# Patient Record
Sex: MALE | URBAN - METROPOLITAN AREA
[De-identification: names, ages, dates, MRNs, and addresses within clinical notes are randomized per-mention and may not be internally consistent; named-entity substitution may affect disease eponyms.]

---

## 2021-06-29 ENCOUNTER — OFFICE VISIT (OUTPATIENT)
Dept: URBAN - METROPOLITAN AREA CLINIC 64 | Facility: CLINIC | Age: 71
End: 2021-06-29
Payer: COMMERCIAL

## 2021-06-29 PROCEDURE — 92134 CPTRZ OPH DX IMG PST SGM RTA: CPT | Performed by: OPTOMETRIST

## 2021-06-29 PROCEDURE — 92004 COMPRE OPH EXAM NEW PT 1/>: CPT | Performed by: OPTOMETRIST

## 2021-06-29 ASSESSMENT — INTRAOCULAR PRESSURE
OD: 11
OS: 13

## 2021-06-29 ASSESSMENT — KERATOMETRY
OD: 42.16
OS: 41.92

## 2021-06-29 ASSESSMENT — VISUAL ACUITY
OS: 20/40
OD: 20/30

## 2021-06-29 NOTE — IMPRESSION/PLAN
Impression: Diabetes mellitus Type 2 with moderate non-proliferative retinopathy with macular edema, bilateral: Z32.2053. Plan: Pt. ed. on findings. He reports vision changes started about a year ago. Recommend he see a retina specialist to prevent further vision loss. Watch blood sugar closely.

## 2021-08-17 ENCOUNTER — OFFICE VISIT (OUTPATIENT)
Dept: URBAN - METROPOLITAN AREA CLINIC 64 | Facility: CLINIC | Age: 71
End: 2021-08-17
Payer: COMMERCIAL

## 2021-08-17 PROCEDURE — 99204 OFFICE O/P NEW MOD 45 MIN: CPT | Performed by: OPHTHALMOLOGY

## 2021-08-17 PROCEDURE — 67028 INJECTION EYE DRUG: CPT | Performed by: OPHTHALMOLOGY

## 2021-08-17 PROCEDURE — 92134 CPTRZ OPH DX IMG PST SGM RTA: CPT | Performed by: OPHTHALMOLOGY

## 2021-08-17 ASSESSMENT — INTRAOCULAR PRESSURE
OD: 17
OS: 17

## 2021-08-17 NOTE — IMPRESSION/PLAN
Impression: Presence of intraocular lens: Z96.1. Plan: Clear PCIOL OU. No further tx needed. View good.    [facility] Dr. Joan Desouza

## 2021-08-17 NOTE — IMPRESSION/PLAN
Impression: DM2 mod/severe non-prolif retinopathy w  DME cysts OS > OD Plan: Pt. ed. on findings. Pt confesses poor care. UNK A1c. MUST GET A1c today send lab. IF <12.0 may qualify for PAVILION. OS too much edema must start tx w injx. OD <325um OK to PAVILION OD.   TODAY Avastin OS injx (proc note). RTC <1w w A1c results (IF <12.0) and plan PAVILION SCREEN for OD
   SOC injx OS today begin and continue. IF DECLINE STUDY - ND/inj/pos OCT Avast OU May be candidate for research study - no other eye dz, no glaucoma, no subfoveal Hmg or subfoveal atrophy / fibrosis. Long d/w pt r/b/a and options for standard Tx v. Research Study med options. May need Travel assist but pt is committed to care.

## 2021-09-23 ENCOUNTER — OFFICE VISIT (OUTPATIENT)
Dept: URBAN - METROPOLITAN AREA CLINIC 64 | Facility: CLINIC | Age: 71
End: 2021-09-23
Payer: COMMERCIAL

## 2021-09-23 DIAGNOSIS — Z96.1 PRESENCE OF INTRAOCULAR LENS: ICD-10-CM

## 2021-09-23 PROCEDURE — 92134 CPTRZ OPH DX IMG PST SGM RTA: CPT | Performed by: OPHTHALMOLOGY

## 2021-09-23 ASSESSMENT — INTRAOCULAR PRESSURE
OS: 11
OD: 11

## 2021-09-23 NOTE — IMPRESSION/PLAN
Impression: Presence of intraocular lens: Z96.1. Plan: Clear PCIOL OU. No further tx needed. View good.    [facility] Dr. Powell Persons

## 2021-09-23 NOTE — IMPRESSION/PLAN
Impression: DM2 mod/severe non-prolif retinopathy w  DME cysts OS > OD Plan: hx: [[Pt confesses poor care. UNK A1c. MUST GET A1c. Pt DECLINED [facility] study - BEGIN [facility] in Sep '21]] TODAY Avastin OU injx (proc note)   -- Future ND? RTC 4-5w overbook OK -- FA baseline and plan Avastin OS (then OD PRN) Future may benefit ILUVIEN or STEROID in the OS. LARGE MA and CYSTS.

## 2021-10-29 ENCOUNTER — OFFICE VISIT (OUTPATIENT)
Dept: URBAN - METROPOLITAN AREA CLINIC 10 | Facility: CLINIC | Age: 71
End: 2021-10-29
Payer: COMMERCIAL

## 2021-10-29 PROCEDURE — 92235 FLUORESCEIN ANGRPH MLTIFRAME: CPT | Performed by: OPHTHALMOLOGY

## 2021-10-29 PROCEDURE — 67028 INJECTION EYE DRUG: CPT | Performed by: OPHTHALMOLOGY

## 2021-10-29 PROCEDURE — 92134 CPTRZ OPH DX IMG PST SGM RTA: CPT | Performed by: OPHTHALMOLOGY

## 2021-10-29 PROCEDURE — 92250 FUNDUS PHOTOGRAPHY W/I&R: CPT | Performed by: OPHTHALMOLOGY

## 2021-10-29 ASSESSMENT — INTRAOCULAR PRESSURE
OS: 14
OD: 13

## 2021-10-29 NOTE — IMPRESSION/PLAN
Impression: DM2 mod/severe non-prolif retinopathy w  DME cysts OS > OD Plan: hx: [[Pt confesses poor care. UNK A1c. MUST GET A1c. Pt DECLINED [facility] study - BEGIN [facility] in Sep '21 -- Avastin inj OS / PRN OD . Di Jacquelyn . Future steroid? Extend?]] TODAY Avastin OU injx (proc note)   -- Future Exam ?   
    RTC 6w ND/inj/OCT plan Avastin OS (OD move to PRN) Future may benefit ILUVIEN or STEROID in the OS. LARGE MA and CYSTS.

## 2021-12-07 ENCOUNTER — OFFICE VISIT (OUTPATIENT)
Dept: URBAN - METROPOLITAN AREA CLINIC 64 | Facility: CLINIC | Age: 71
End: 2021-12-07
Payer: COMMERCIAL

## 2021-12-07 PROCEDURE — 67028 INJECTION EYE DRUG: CPT | Performed by: OPHTHALMOLOGY

## 2021-12-07 PROCEDURE — 92134 CPTRZ OPH DX IMG PST SGM RTA: CPT | Performed by: OPHTHALMOLOGY

## 2021-12-07 ASSESSMENT — INTRAOCULAR PRESSURE
OS: 16
OD: 16

## 2021-12-07 NOTE — IMPRESSION/PLAN
Impression: DM2 mod/severe non-prolif retinopathy w  DME cysts OS > OD Plan: hx: [[Pt confesses poor care. UNK A1c. MUST GET A1c. Pt DECLINED [facility] study - BEGIN [facility] in Sep '21 -- Avastin inj OS / PRN OD . Tigist Nunez . Future steroid? Extend?]] TODAY Avastin OU injx (proc note)   -- Future FA ? (FAIL Avastin) RTC 6w  Dil, OCT, eval - h/o inj OS EYLEA  (FAIL Avastin OS -- OD move to PRN) Future may benefit ILUVIEN or STEROID  OS. LARGE MA and CYSTS.

## 2021-12-07 NOTE — IMPRESSION/PLAN
Impression: Presence of intraocular lens: Z96.1. Plan: Clear PCIOL OU. No further tx needed. View good. Gen eye care Dr. Karely Gilman pt to get to [facility] and get A1c and f/u [facility] eye care PRN

## 2022-01-13 ENCOUNTER — OFFICE VISIT (OUTPATIENT)
Dept: URBAN - METROPOLITAN AREA CLINIC 64 | Facility: CLINIC | Age: 72
End: 2022-01-13
Payer: COMMERCIAL

## 2022-01-13 PROCEDURE — 99214 OFFICE O/P EST MOD 30 MIN: CPT | Performed by: OPHTHALMOLOGY

## 2022-01-13 PROCEDURE — 67028 INJECTION EYE DRUG: CPT | Performed by: OPHTHALMOLOGY

## 2022-01-13 PROCEDURE — 92134 CPTRZ OPH DX IMG PST SGM RTA: CPT | Performed by: OPHTHALMOLOGY

## 2022-01-13 ASSESSMENT — INTRAOCULAR PRESSURE
OS: 19
OD: 17

## 2022-01-13 NOTE — IMPRESSION/PLAN
Impression: DM2 mod/severe NPDR w DME OS >OD Plan: hx: [[Pt confesses poor care. UNK A1c. MUST GET A1c. Pt DECLINED [facility] study - BEGIN [facility] in Sep '21 -- Avastin inj OS / PRN OD . Brenita Danyel . Future steroid? Extend?]] TODAY EyLea OU injx (proc note)   -- Future ND?   (FAIL Avastin) RTC 6w Pos FA / plan inj OS EYLEA  (OD only PRN-- FAIL Avastin OS) Future may benefit ILUVIEN or STEROID  OS. LARGE MA and CYSTS.

## 2022-01-13 NOTE — IMPRESSION/PLAN
Impression: Presence of intraocular lens: Z96.1 Few floaters  Plan: Today repeated exam shows no signif. PCO / Centered and Clear PCIOL OU. No further tx needed. View good. Gen eye care Dr. Babs Martínez    URGE pt  f/u gen eye care - URGED keep [facility] and get A1c

## 2022-02-22 ENCOUNTER — OFFICE VISIT (OUTPATIENT)
Dept: URBAN - METROPOLITAN AREA CLINIC 10 | Facility: CLINIC | Age: 72
End: 2022-02-22
Payer: COMMERCIAL

## 2022-02-22 DIAGNOSIS — E11.3313 DIABETES MELLITUS TYPE 2 WITH MODERATE NON-PROLIFERATIVE RETINOPATHY WITH MACULAR EDEMA, BILATERAL: Primary | ICD-10-CM

## 2022-02-22 PROCEDURE — 92134 CPTRZ OPH DX IMG PST SGM RTA: CPT | Performed by: OPHTHALMOLOGY

## 2022-02-22 PROCEDURE — 67028 INJECTION EYE DRUG: CPT | Performed by: OPHTHALMOLOGY

## 2022-02-22 PROCEDURE — 92250 FUNDUS PHOTOGRAPHY W/I&R: CPT | Performed by: OPHTHALMOLOGY

## 2022-02-22 PROCEDURE — 99214 OFFICE O/P EST MOD 30 MIN: CPT | Performed by: OPHTHALMOLOGY

## 2022-02-22 PROCEDURE — 92235 FLUORESCEIN ANGRPH MLTIFRAME: CPT | Performed by: OPHTHALMOLOGY

## 2022-02-22 NOTE — IMPRESSION/PLAN
Impression: DM2 mod/severe NPDR w DME OS >OD Plan: hx: [[Pt confesses poor care. UNK A1c. MUST GET A1c. Pt DECLINED [facility] study - BEGIN [facility] in Sep '21 -- Avastin inj OS / PRN OD . Joanna Santoyo . Future steroid? Extend?]] TODAY EyLea OU injx (proc note)   -- Future Exam?   (FAIL Avastin) RTC 6w ND/injx/OCT plan inj OS EYLEA  (OD only PRN-- FAIL Avastin OS) AFTER Mar/Apr injx plan VIT / ERM / ILM peel /STTA / Focal laser OS Future may benefit ILUVIEN or STEROID  OS AFTER VIT peels is done - may need to treat the LARGE MA and CYSTS.

## 2022-03-29 ENCOUNTER — OFFICE VISIT (OUTPATIENT)
Dept: URBAN - METROPOLITAN AREA CLINIC 64 | Facility: CLINIC | Age: 72
End: 2022-03-29
Payer: COMMERCIAL

## 2022-03-29 PROCEDURE — 67028 INJECTION EYE DRUG: CPT | Performed by: OPHTHALMOLOGY

## 2022-03-29 PROCEDURE — 92134 CPTRZ OPH DX IMG PST SGM RTA: CPT | Performed by: OPHTHALMOLOGY

## 2022-03-29 ASSESSMENT — INTRAOCULAR PRESSURE
OS: 13
OD: 13

## 2022-03-29 NOTE — IMPRESSION/PLAN
Impression: DM2 mod/severe NPDR w DME OS >OD  SOC injx OS Plan: hx: [[Pt confesses poor care. UNK A1c. MUST GET A1c. Pt DECLINED [facility] study - BEGIN [facility] in Sep '21 -- Avastin inj OS / PRN OD . Danny Estrada . Future steroid? Extend?]] TODAY EyLea OS injx (proc note)   -- Future FA? (FAIL Avastin) RTC 6-7w Dil, OCT, eval - h/o OS EYLEA  (OD only PRN-- FAIL Avastin OS) AFTER Mar/Apr injx plan VIT / ERM / ILM peel /STTA / Focal laser OS Future may benefit ILUVIEN or STEROID  OS AFTER VIT peels is done - may need to treat the LARGE MA and CYSTS.

## 2023-04-10 ENCOUNTER — OFFICE VISIT (OUTPATIENT)
Dept: URBAN - METROPOLITAN AREA CLINIC 64 | Facility: CLINIC | Age: 73
End: 2023-04-10
Payer: COMMERCIAL

## 2023-04-10 DIAGNOSIS — E11.3313 TYPE 2 DIABETES MELLITUS WITH MODERATE NONPROLIFERATIVE DIABETIC RETINOPATHY WITH MACULAR EDEMA, BILATERAL: Primary | ICD-10-CM

## 2023-04-10 DIAGNOSIS — H43.822 VITREOMACULAR ADHESION, LEFT EYE: ICD-10-CM

## 2023-04-10 PROCEDURE — 67028 INJECTION EYE DRUG: CPT | Performed by: OPHTHALMOLOGY

## 2023-04-10 PROCEDURE — 92134 CPTRZ OPH DX IMG PST SGM RTA: CPT | Performed by: OPHTHALMOLOGY

## 2023-04-10 PROCEDURE — 99214 OFFICE O/P EST MOD 30 MIN: CPT | Performed by: OPHTHALMOLOGY

## 2023-04-10 ASSESSMENT — INTRAOCULAR PRESSURE
OD: 16
OS: 15

## 2023-04-10 NOTE — IMPRESSION/PLAN
Impression: Type 2 diabetes mellitus with moderate nonproliferative diabetic retinopathy with macular edema, bilateral: E60.7210. Plan: OD >> OS, re-start anti-VEGF therapy, FARHANA OD today, RTC 4 weeks nDFEx/OCT mac/FARHANA OD, treat-and-extend as able. Excellent candidate for adjunctive focal laser once DME improves.

## 2023-05-11 ENCOUNTER — OFFICE VISIT (OUTPATIENT)
Dept: URBAN - METROPOLITAN AREA CLINIC 64 | Facility: CLINIC | Age: 73
End: 2023-05-11
Payer: COMMERCIAL

## 2023-05-11 DIAGNOSIS — E11.3313 TYPE 2 DIABETES MELLITUS WITH MODERATE NONPROLIFERATIVE DIABETIC RETINOPATHY WITH MACULAR EDEMA, BILATERAL: Primary | ICD-10-CM

## 2023-05-11 DIAGNOSIS — H43.822 VITREOMACULAR ADHESION, LEFT EYE: ICD-10-CM

## 2023-05-11 PROCEDURE — 67028 INJECTION EYE DRUG: CPT | Performed by: OPHTHALMOLOGY

## 2023-05-11 ASSESSMENT — INTRAOCULAR PRESSURE
OD: 17
OS: 15

## 2023-05-11 NOTE — IMPRESSION/PLAN
Impression: Type 2 diabetes mellitus with moderate nonproliferative diabetic retinopathy with macular edema, bilateral: P47.7080. Plan: OD >> OS, continue anti-VEGF therapy, FARHANA OD today, RTC 4 weeks nDFEx/OCT mac/FARHANA OD, treat-and-extend as able. Excellent candidate for adjunctive focal laser once DME improves.

## 2023-06-08 ENCOUNTER — PROCEDURE (OUTPATIENT)
Dept: URBAN - METROPOLITAN AREA CLINIC 64 | Facility: LOCATION | Age: 73
End: 2023-06-08
Payer: COMMERCIAL

## 2023-06-08 DIAGNOSIS — E11.3313 TYPE 2 DIABETES MELLITUS WITH MODERATE NONPROLIFERATIVE DIABETIC RETINOPATHY WITH MACULAR EDEMA, BILATERAL: Primary | ICD-10-CM

## 2023-06-08 PROCEDURE — 67028 INJECTION EYE DRUG: CPT | Performed by: OPHTHALMOLOGY

## 2023-06-08 PROCEDURE — 92134 CPTRZ OPH DX IMG PST SGM RTA: CPT | Performed by: OPHTHALMOLOGY

## 2023-06-08 ASSESSMENT — INTRAOCULAR PRESSURE
OD: 15
OS: 13

## 2023-06-08 NOTE — IMPRESSION/PLAN
Impression: Type 2 diabetes mellitus with moderate nonproliferative diabetic retinopathy with macular edema, bilateral: F55.0161. Plan: OD > OS, limited response to AVASTIN OD, will change to CIMERLI 0.3 OD at next visit, FARHANA OD today, RTC 2 weeks focal laser OD, RTC 4 weeks nDFEx/OCT mac/CIMERLI 0.3 (needs auth) OD. OS w/ increasing foveal-centered CME, may require starting anti-VEGF therapy in the near future.

## 2023-06-19 ENCOUNTER — PROCEDURE (OUTPATIENT)
Dept: URBAN - METROPOLITAN AREA CLINIC 64 | Facility: LOCATION | Age: 73
End: 2023-06-19
Payer: COMMERCIAL

## 2023-06-19 DIAGNOSIS — E11.3313 TYPE 2 DIABETES MELLITUS WITH MODERATE NONPROLIFERATIVE DIABETIC RETINOPATHY WITH MACULAR EDEMA, BILATERAL: Primary | ICD-10-CM

## 2023-06-19 PROCEDURE — 67210 TREATMENT OF RETINAL LESION: CPT | Performed by: OPHTHALMOLOGY

## 2023-06-19 ASSESSMENT — INTRAOCULAR PRESSURE
OD: 11
OS: 14

## 2023-06-19 NOTE — IMPRESSION/PLAN
Impression: Type 2 diabetes mellitus with moderate nonproliferative diabetic retinopathy with macular edema, bilateral: V80.4690. Plan: OD > Focal laser to juxtafoveal MAs performed today, RTC as scheduled for q4 week CIMERLI 0.3; OS > increasing foveal-centered CME, may require starting anti-VEGF therapy in the near future.

## 2023-07-11 ENCOUNTER — OFFICE VISIT (OUTPATIENT)
Dept: URBAN - METROPOLITAN AREA CLINIC 64 | Facility: LOCATION | Age: 73
End: 2023-07-11
Payer: COMMERCIAL

## 2023-07-11 DIAGNOSIS — E11.3313 TYPE 2 DIABETES MELLITUS WITH MODERATE NONPROLIFERATIVE DIABETIC RETINOPATHY WITH MACULAR EDEMA, BILATERAL: Primary | ICD-10-CM

## 2023-07-11 PROCEDURE — 67028 INJECTION EYE DRUG: CPT | Performed by: OPHTHALMOLOGY

## 2023-07-11 PROCEDURE — 99214 OFFICE O/P EST MOD 30 MIN: CPT | Performed by: OPHTHALMOLOGY

## 2023-07-11 PROCEDURE — 92134 CPTRZ OPH DX IMG PST SGM RTA: CPT | Performed by: OPHTHALMOLOGY

## 2023-07-11 ASSESSMENT — INTRAOCULAR PRESSURE: OD: 9

## 2023-07-11 NOTE — IMPRESSION/PLAN
Impression: Type 2 diabetes mellitus with moderate nonproliferative diabetic retinopathy with macular edema, bilateral: L58.8384. Plan: OD > s/p focal laser 6/2023, persistent exudation, change to CIMERLI 0.3 OD today, RTC 4 weeks nDFEx/OCT mac/CIMERLI 0.3 OD; OS >Stable, observe.

## 2023-08-08 ENCOUNTER — PROCEDURE (OUTPATIENT)
Dept: URBAN - METROPOLITAN AREA CLINIC 64 | Facility: LOCATION | Age: 73
End: 2023-08-08
Payer: COMMERCIAL

## 2023-08-08 DIAGNOSIS — E11.3313 TYPE 2 DIABETES MELLITUS WITH MODERATE NONPROLIFERATIVE DIABETIC RETINOPATHY WITH MACULAR EDEMA, BILATERAL: Primary | ICD-10-CM

## 2023-08-08 PROCEDURE — 67028 INJECTION EYE DRUG: CPT | Performed by: OPHTHALMOLOGY

## 2023-08-08 PROCEDURE — 92134 CPTRZ OPH DX IMG PST SGM RTA: CPT | Performed by: OPHTHALMOLOGY

## 2023-08-08 ASSESSMENT — INTRAOCULAR PRESSURE
OD: 16
OS: 12

## 2023-09-05 ENCOUNTER — PROCEDURE (OUTPATIENT)
Dept: URBAN - METROPOLITAN AREA CLINIC 64 | Facility: LOCATION | Age: 73
End: 2023-09-05
Payer: COMMERCIAL

## 2023-09-05 DIAGNOSIS — E11.3313 TYPE 2 DIABETES MELLITUS WITH MODERATE NONPROLIFERATIVE DIABETIC RETINOPATHY WITH MACULAR EDEMA, BILATERAL: Primary | ICD-10-CM

## 2023-09-05 PROCEDURE — 92134 CPTRZ OPH DX IMG PST SGM RTA: CPT | Performed by: OPHTHALMOLOGY

## 2023-09-05 ASSESSMENT — INTRAOCULAR PRESSURE
OD: 18
OS: 19

## 2023-10-03 ENCOUNTER — OFFICE VISIT (OUTPATIENT)
Dept: URBAN - METROPOLITAN AREA CLINIC 64 | Facility: LOCATION | Age: 73
End: 2023-10-03
Payer: COMMERCIAL

## 2023-10-03 DIAGNOSIS — E11.3313 TYPE 2 DIABETES MELLITUS WITH MODERATE NONPROLIFERATIVE DIABETIC RETINOPATHY WITH MACULAR EDEMA, BILATERAL: Primary | ICD-10-CM

## 2023-10-03 PROCEDURE — 92134 CPTRZ OPH DX IMG PST SGM RTA: CPT | Performed by: OPHTHALMOLOGY

## 2023-10-03 ASSESSMENT — INTRAOCULAR PRESSURE
OS: 12
OD: 14

## 2023-10-31 ENCOUNTER — OFFICE VISIT (OUTPATIENT)
Dept: URBAN - METROPOLITAN AREA CLINIC 64 | Facility: LOCATION | Age: 73
End: 2023-10-31
Payer: COMMERCIAL

## 2023-10-31 DIAGNOSIS — E11.3313 TYPE 2 DIABETES MELLITUS WITH MODERATE NONPROLIFERATIVE DIABETIC RETINOPATHY WITH MACULAR EDEMA, BILATERAL: Primary | ICD-10-CM

## 2023-10-31 PROCEDURE — 92134 CPTRZ OPH DX IMG PST SGM RTA: CPT | Performed by: OPHTHALMOLOGY

## 2023-10-31 ASSESSMENT — INTRAOCULAR PRESSURE
OD: 14
OS: 14

## 2023-12-04 ENCOUNTER — OFFICE VISIT (OUTPATIENT)
Dept: URBAN - METROPOLITAN AREA CLINIC 64 | Facility: LOCATION | Age: 73
End: 2023-12-04
Payer: COMMERCIAL

## 2023-12-04 PROCEDURE — 92134 CPTRZ OPH DX IMG PST SGM RTA: CPT | Performed by: OPHTHALMOLOGY

## 2023-12-04 ASSESSMENT — INTRAOCULAR PRESSURE
OS: 19
OD: 21

## 2024-01-09 ENCOUNTER — OFFICE VISIT (OUTPATIENT)
Dept: URBAN - METROPOLITAN AREA CLINIC 64 | Facility: LOCATION | Age: 74
End: 2024-01-09
Payer: COMMERCIAL

## 2024-01-09 DIAGNOSIS — E11.3313 TYPE 2 DIABETES MELLITUS WITH MODERATE NONPROLIFERATIVE DIABETIC RETINOPATHY WITH MACULAR EDEMA, BILATERAL: Primary | ICD-10-CM

## 2024-01-09 PROCEDURE — 92134 CPTRZ OPH DX IMG PST SGM RTA: CPT | Performed by: OPHTHALMOLOGY

## 2024-01-09 ASSESSMENT — INTRAOCULAR PRESSURE
OD: 20
OS: 21

## 2024-02-20 ENCOUNTER — OFFICE VISIT (OUTPATIENT)
Dept: URBAN - METROPOLITAN AREA CLINIC 64 | Facility: LOCATION | Age: 74
End: 2024-02-20
Payer: COMMERCIAL

## 2024-02-20 DIAGNOSIS — E11.3313 TYPE 2 DIABETES MELLITUS WITH MODERATE NONPROLIFERATIVE DIABETIC RETINOPATHY WITH MACULAR EDEMA, BILATERAL: Primary | ICD-10-CM

## 2024-02-20 PROCEDURE — 92134 CPTRZ OPH DX IMG PST SGM RTA: CPT | Performed by: OPHTHALMOLOGY

## 2024-02-20 ASSESSMENT — INTRAOCULAR PRESSURE
OS: 20
OD: 17

## 2024-04-11 ENCOUNTER — OFFICE VISIT (OUTPATIENT)
Dept: URBAN - METROPOLITAN AREA CLINIC 64 | Facility: LOCATION | Age: 74
End: 2024-04-11
Payer: COMMERCIAL

## 2024-04-11 DIAGNOSIS — E11.3313 TYPE 2 DIABETES MELLITUS WITH MODERATE NONPROLIFERATIVE DIABETIC RETINOPATHY WITH MACULAR EDEMA, BILATERAL: Primary | ICD-10-CM

## 2024-04-11 PROCEDURE — 92134 CPTRZ OPH DX IMG PST SGM RTA: CPT | Performed by: OPHTHALMOLOGY

## 2024-04-11 ASSESSMENT — INTRAOCULAR PRESSURE
OD: 17
OS: 18

## 2024-06-06 ENCOUNTER — OFFICE VISIT (OUTPATIENT)
Dept: URBAN - METROPOLITAN AREA CLINIC 64 | Facility: LOCATION | Age: 74
End: 2024-06-06
Payer: COMMERCIAL

## 2024-06-06 DIAGNOSIS — E11.3313 TYPE 2 DIABETES MELLITUS WITH MODERATE NONPROLIFERATIVE DIABETIC RETINOPATHY WITH MACULAR EDEMA, BILATERAL: Primary | ICD-10-CM

## 2024-06-06 PROCEDURE — 92134 CPTRZ OPH DX IMG PST SGM RTA: CPT | Performed by: OPHTHALMOLOGY

## 2024-06-06 ASSESSMENT — INTRAOCULAR PRESSURE
OD: 21
OS: 21

## 2024-08-06 ENCOUNTER — OFFICE VISIT (OUTPATIENT)
Dept: URBAN - METROPOLITAN AREA CLINIC 64 | Facility: LOCATION | Age: 74
End: 2024-08-06
Payer: COMMERCIAL

## 2024-08-06 DIAGNOSIS — E11.3313 TYPE 2 DIABETES MELLITUS WITH MODERATE NONPROLIFERATIVE DIABETIC RETINOPATHY WITH MACULAR EDEMA, BILATERAL: Primary | ICD-10-CM

## 2024-08-06 PROCEDURE — 99214 OFFICE O/P EST MOD 30 MIN: CPT | Performed by: OPHTHALMOLOGY

## 2024-08-06 PROCEDURE — 92134 CPTRZ OPH DX IMG PST SGM RTA: CPT | Performed by: OPHTHALMOLOGY

## 2024-08-06 ASSESSMENT — INTRAOCULAR PRESSURE
OS: 19
OD: 19

## 2024-10-01 ENCOUNTER — OFFICE VISIT (OUTPATIENT)
Dept: URBAN - METROPOLITAN AREA CLINIC 64 | Facility: LOCATION | Age: 74
End: 2024-10-01
Payer: COMMERCIAL

## 2024-10-01 DIAGNOSIS — E11.3313 TYPE 2 DIABETES MELLITUS WITH MODERATE NONPROLIFERATIVE DIABETIC RETINOPATHY WITH MACULAR EDEMA, BILATERAL: Primary | ICD-10-CM

## 2024-10-01 PROCEDURE — 92134 CPTRZ OPH DX IMG PST SGM RTA: CPT | Performed by: OPHTHALMOLOGY

## 2024-10-01 PROCEDURE — 99214 OFFICE O/P EST MOD 30 MIN: CPT | Performed by: OPHTHALMOLOGY

## 2024-10-01 ASSESSMENT — INTRAOCULAR PRESSURE
OD: 21
OS: 21